# Patient Record
Sex: FEMALE | Race: WHITE | NOT HISPANIC OR LATINO | URBAN - METROPOLITAN AREA
[De-identification: names, ages, dates, MRNs, and addresses within clinical notes are randomized per-mention and may not be internally consistent; named-entity substitution may affect disease eponyms.]

---

## 2017-02-24 ENCOUNTER — IMPORTED ENCOUNTER (OUTPATIENT)
Dept: URBAN - METROPOLITAN AREA CLINIC 43 | Facility: CLINIC | Age: 62
End: 2017-02-24

## 2017-02-24 PROBLEM — H40.1131: Noted: 2017-02-24

## 2017-08-24 ENCOUNTER — IMPORTED ENCOUNTER (OUTPATIENT)
Dept: URBAN - METROPOLITAN AREA CLINIC 43 | Facility: CLINIC | Age: 62
End: 2017-08-24

## 2017-08-24 PROBLEM — H25.13: Noted: 2017-08-24

## 2017-08-24 PROBLEM — H40.1131: Noted: 2017-08-24

## 2018-04-12 ENCOUNTER — IMPORTED ENCOUNTER (OUTPATIENT)
Dept: URBAN - METROPOLITAN AREA CLINIC 43 | Facility: CLINIC | Age: 63
End: 2018-04-12

## 2018-04-12 PROBLEM — H40.1131: Noted: 2018-04-12

## 2018-10-18 ENCOUNTER — IMPORTED ENCOUNTER (OUTPATIENT)
Dept: URBAN - METROPOLITAN AREA CLINIC 43 | Facility: CLINIC | Age: 63
End: 2018-10-18

## 2018-10-18 PROBLEM — H40.1131: Noted: 2018-10-18

## 2018-10-18 PROBLEM — H25.13: Noted: 2018-10-18

## 2018-10-23 ENCOUNTER — IMPORTED ENCOUNTER (OUTPATIENT)
Dept: URBAN - METROPOLITAN AREA CLINIC 43 | Facility: CLINIC | Age: 63
End: 2018-10-23

## 2019-04-11 ENCOUNTER — IMPORTED ENCOUNTER (OUTPATIENT)
Dept: URBAN - METROPOLITAN AREA CLINIC 43 | Facility: CLINIC | Age: 64
End: 2019-04-11

## 2019-04-11 ENCOUNTER — PREPPED CHART (OUTPATIENT)
Dept: URBAN - METROPOLITAN AREA CLINIC 32 | Facility: CLINIC | Age: 64
End: 2019-04-11

## 2019-04-11 PROBLEM — H40.1131: Noted: 2019-04-11

## 2019-09-26 ASSESSMENT — VISUAL ACUITY
OD_CC: J1+
OD_SC: 20/20-1
OS_CC: J1+
OS_SC: 20/25+2

## 2019-09-26 ASSESSMENT — PACHYMETRY
OD_CT_UM: 536
OS_CT_UM: 517

## 2019-09-26 ASSESSMENT — TONOMETRY
OS_IOP_MMHG: 13
OD_IOP_MMHG: 14

## 2019-10-17 ENCOUNTER — ESTABLISHED COMPREHENSIVE EXAM (OUTPATIENT)
Dept: URBAN - METROPOLITAN AREA CLINIC 32 | Facility: CLINIC | Age: 64
End: 2019-10-17

## 2019-10-17 DIAGNOSIS — H40.1131: ICD-10-CM

## 2019-10-17 DIAGNOSIS — H25.13: ICD-10-CM

## 2019-10-17 PROCEDURE — 92133 CPTRZD OPH DX IMG PST SGM ON: CPT

## 2019-10-17 PROCEDURE — 92014 COMPRE OPH EXAM EST PT 1/>: CPT

## 2019-10-17 PROCEDURE — 92015 DETERMINE REFRACTIVE STATE: CPT

## 2019-10-17 ASSESSMENT — KERATOMETRY
OS_K1POWER_DIOPTERS: 46.75
OD_AXISANGLE_DEGREES: 92
OD_AXISANGLE2_DEGREES: 2
OS_AXISANGLE_DEGREES: 72
OD_K2POWER_DIOPTERS: 45.5
OS_AXISANGLE2_DEGREES: 162
OS_K2POWER_DIOPTERS: 49.5
OD_K1POWER_DIOPTERS: 45.5

## 2019-10-17 ASSESSMENT — VISUAL ACUITY
OS_BAT: 20/80
OS_SC: 20/200
OS_SC: J3-1
OD_BAT: 20/400
OS_CC: 20/25-2
OD_CC: 20/30-2
OD_SC: J3-1
OD_SC: 20/200

## 2019-10-17 ASSESSMENT — TONOMETRY
OD_IOP_MMHG: 15
OS_IOP_MMHG: 15

## 2020-03-01 ASSESSMENT — VISUAL ACUITY
OD_SC: 20/150
OS_SC: 20/100
OD_CC: J1+
OS_CC: 20/25
OS_CC: J1+
OS_CC: J1+/-
OD_CC: 20/20-1
OD_CC: J1+/-
OS_CC: 20/25+2
OD_CC: 20/20
OS_CC: 20/20
OD_CC: J1+
OS_CC: 20/20
OD_CC: 20/20 -1
OS_CC: 20/20 -2
OS_CC: J1+
OD_CC: 20/20 -2
OD_CC: 20/20-2

## 2020-03-01 ASSESSMENT — TONOMETRY
OS_IOP_MMHG: 13.0
OD_IOP_MMHG: 14.0
OS_IOP_MMHG: 13.0
OD_IOP_MMHG: 12.0
OD_IOP_MMHG: 14.0
OS_IOP_MMHG: 13.0
OD_IOP_MMHG: 14.0
OD_IOP_MMHG: 12.0

## 2020-09-09 NOTE — PATIENT DISCUSSION
09/09/2020[id number]/20&nbsp;SN &nbsp; &nbsp; lcs

## 2021-06-25 ENCOUNTER — ESTABLISHED COMPREHENSIVE EXAM (OUTPATIENT)
Dept: URBAN - METROPOLITAN AREA CLINIC 32 | Facility: CLINIC | Age: 66
End: 2021-06-25

## 2021-06-25 DIAGNOSIS — H40.1131: ICD-10-CM

## 2021-06-25 PROCEDURE — 92083 EXTENDED VISUAL FIELD XM: CPT

## 2021-06-25 PROCEDURE — 92014 COMPRE OPH EXAM EST PT 1/>: CPT

## 2021-06-25 ASSESSMENT — KERATOMETRY
OS_AXISANGLE2_DEGREES: 163
OD_AXISANGLE_DEGREES: 96
OS_K1POWER_DIOPTERS: 46.75
OD_K1POWER_DIOPTERS: 46
OD_AXISANGLE2_DEGREES: 6
OD_K2POWER_DIOPTERS: 49.25
OS_AXISANGLE_DEGREES: 73
OS_K2POWER_DIOPTERS: 49.75

## 2021-06-25 ASSESSMENT — TONOMETRY
OS_IOP_MMHG: 15
OD_IOP_MMHG: 14

## 2021-06-25 ASSESSMENT — VISUAL ACUITY
OS_CC: J1+
OS_CC: 20/30
OD_CC: J1+
OD_CC: 20/30-2

## 2022-01-10 ENCOUNTER — FOLLOW UP (OUTPATIENT)
Dept: URBAN - METROPOLITAN AREA CLINIC 32 | Facility: CLINIC | Age: 67
End: 2022-01-10

## 2022-01-10 DIAGNOSIS — H40.1131: ICD-10-CM

## 2022-01-10 PROCEDURE — 92133 CPTRZD OPH DX IMG PST SGM ON: CPT

## 2022-01-10 PROCEDURE — 92012 INTRM OPH EXAM EST PATIENT: CPT

## 2022-01-10 ASSESSMENT — KERATOMETRY
OD_K1POWER_DIOPTERS: 46
OS_K1POWER_DIOPTERS: 46.75
OD_AXISANGLE2_DEGREES: 6
OS_AXISANGLE_DEGREES: 73
OD_K2POWER_DIOPTERS: 49.25
OS_AXISANGLE2_DEGREES: 163
OS_K2POWER_DIOPTERS: 49.75
OD_AXISANGLE_DEGREES: 96

## 2022-01-10 ASSESSMENT — VISUAL ACUITY
OD_CC: 20/30
OS_GLARE: 20/400
OD_GLARE: 20/400
OS_CC: 20/30

## 2022-01-10 ASSESSMENT — TONOMETRY
OD_IOP_MMHG: 13
OS_IOP_MMHG: 11

## 2022-01-31 ENCOUNTER — DIAGNOSTICS ONLY (OUTPATIENT)
Dept: URBAN - METROPOLITAN AREA CLINIC 32 | Facility: CLINIC | Age: 67
End: 2022-01-31

## 2022-01-31 DIAGNOSIS — H25.13: ICD-10-CM

## 2022-01-31 DIAGNOSIS — H35.361: ICD-10-CM

## 2022-01-31 DIAGNOSIS — H16.223: ICD-10-CM

## 2022-01-31 DIAGNOSIS — H40.1131: ICD-10-CM

## 2022-01-31 DIAGNOSIS — H25.813: ICD-10-CM

## 2022-01-31 PROCEDURE — 92014 COMPRE OPH EXAM EST PT 1/>: CPT

## 2022-01-31 PROCEDURE — 92136TC INTERFEROMETRY - TECHNICAL COMPONENT

## 2022-01-31 PROCEDURE — 92025 CPTRIZED CORNEAL TOPOGRAPHY: CPT

## 2022-01-31 PROCEDURE — 92134 CPTRZ OPH DX IMG PST SGM RTA: CPT

## 2022-01-31 PROCEDURE — V2799PMN IMPRIMIS PRED-MOXI-NEPAF 5ML

## 2022-01-31 ASSESSMENT — VISUAL ACUITY
OS_CC: 20/20
OD_SC: J3-1
OS_GLARE: 20/400
OS_CC: J1+
OD_CC: 20/20
OS_SC: J3-1
OD_SC: 20/200
OS_SC: 20/200
OD_GLARE: 20/400
OD_CC: J1+

## 2022-01-31 ASSESSMENT — KERATOMETRY
OS_K1POWER_DIOPTERS: 46.75
OD_AXISANGLE_DEGREES: 96
OS_AXISANGLE2_DEGREES: 163
OS_K2POWER_DIOPTERS: 49.75
OD_K2POWER_DIOPTERS: 49.25
OD_K1POWER_DIOPTERS: 46
OD_AXISANGLE2_DEGREES: 6
OS_AXISANGLE_DEGREES: 73

## 2022-02-08 ENCOUNTER — SURGERY/PROCEDURE (OUTPATIENT)
Dept: URBAN - METROPOLITAN AREA CLINIC 32 | Facility: CLINIC | Age: 67
End: 2022-02-08

## 2022-02-08 DIAGNOSIS — H25.811: ICD-10-CM

## 2022-02-08 PROCEDURE — 66984CV REMOVE CATARACT, INSERT LENS, CUSTOM VISION

## 2022-02-08 ASSESSMENT — KERATOMETRY
OD_AXISANGLE_DEGREES: 96
OS_AXISANGLE2_DEGREES: 163
OS_AXISANGLE_DEGREES: 73
OD_AXISANGLE2_DEGREES: 6
OD_K2POWER_DIOPTERS: 49.25
OS_K2POWER_DIOPTERS: 49.75
OD_K1POWER_DIOPTERS: 46
OS_K1POWER_DIOPTERS: 46.75

## 2022-02-09 ENCOUNTER — POST-OP (OUTPATIENT)
Dept: URBAN - METROPOLITAN AREA CLINIC 32 | Facility: CLINIC | Age: 67
End: 2022-02-09

## 2022-02-09 DIAGNOSIS — Z96.1: ICD-10-CM

## 2022-02-09 PROCEDURE — 99024 POSTOP FOLLOW-UP VISIT: CPT

## 2022-02-09 ASSESSMENT — VISUAL ACUITY: OD_SC: 20/30-2

## 2022-02-09 ASSESSMENT — TONOMETRY: OD_IOP_MMHG: 21

## 2022-02-11 ASSESSMENT — KERATOMETRY
OD_K1POWER_DIOPTERS: 46
OD_K2POWER_DIOPTERS: 49.25
OS_AXISANGLE_DEGREES: 73
OS_K1POWER_DIOPTERS: 46.75
OD_AXISANGLE2_DEGREES: 6
OD_AXISANGLE_DEGREES: 96
OS_K2POWER_DIOPTERS: 49.75
OS_AXISANGLE2_DEGREES: 163

## 2022-02-16 ENCOUNTER — POST-OP (OUTPATIENT)
Dept: URBAN - METROPOLITAN AREA CLINIC 32 | Facility: CLINIC | Age: 67
End: 2022-02-16

## 2022-02-16 DIAGNOSIS — H25.812: ICD-10-CM

## 2022-02-16 PROCEDURE — 92012 INTRM OPH EXAM EST PATIENT: CPT

## 2022-02-16 ASSESSMENT — KERATOMETRY
OD_AXISANGLE_DEGREES: 96
OS_K1POWER_DIOPTERS: 46.75
OS_AXISANGLE2_DEGREES: 163
OD_K1POWER_DIOPTERS: 46
OS_AXISANGLE_DEGREES: 73
OS_K2POWER_DIOPTERS: 49.75
OD_AXISANGLE2_DEGREES: 6
OD_K2POWER_DIOPTERS: 49.25

## 2022-02-16 ASSESSMENT — TONOMETRY: OD_IOP_MMHG: 15

## 2022-02-16 ASSESSMENT — VISUAL ACUITY
OD_SC: J10
OD_SC: 20/20
OS_GLARE: 20/400

## 2022-02-22 ENCOUNTER — SURGERY/PROCEDURE (OUTPATIENT)
Dept: URBAN - METROPOLITAN AREA CLINIC 32 | Facility: CLINIC | Age: 67
End: 2022-02-22

## 2022-02-22 DIAGNOSIS — H25.812: ICD-10-CM

## 2022-02-22 PROCEDURE — 66984CV REMOVE CATARACT, INSERT LENS, CUSTOM VISION

## 2022-02-23 ENCOUNTER — POST-OP (OUTPATIENT)
Dept: URBAN - METROPOLITAN AREA CLINIC 32 | Facility: CLINIC | Age: 67
End: 2022-02-23

## 2022-02-23 DIAGNOSIS — Z96.1: ICD-10-CM

## 2022-02-23 PROCEDURE — 99024 POSTOP FOLLOW-UP VISIT: CPT

## 2022-02-23 ASSESSMENT — KERATOMETRY
OS_AXISANGLE_DEGREES: 73
OS_K1POWER_DIOPTERS: 46.75
OD_K2POWER_DIOPTERS: 49.25
OS_K2POWER_DIOPTERS: 49.75
OD_AXISANGLE2_DEGREES: 6
OS_AXISANGLE2_DEGREES: 163
OD_K1POWER_DIOPTERS: 46
OD_AXISANGLE_DEGREES: 96

## 2022-02-23 ASSESSMENT — VISUAL ACUITY
OS_PH: 20/50
OS_SC: 20/60

## 2022-02-23 ASSESSMENT — TONOMETRY: OS_IOP_MMHG: 24

## 2022-02-25 ASSESSMENT — KERATOMETRY
OS_AXISANGLE2_DEGREES: 163
OD_AXISANGLE_DEGREES: 96
OS_AXISANGLE_DEGREES: 73
OS_K2POWER_DIOPTERS: 49.75
OD_AXISANGLE2_DEGREES: 6
OD_K2POWER_DIOPTERS: 49.25
OD_K1POWER_DIOPTERS: 46
OS_K1POWER_DIOPTERS: 46.75

## 2022-03-02 ENCOUNTER — POST-OP (OUTPATIENT)
Dept: URBAN - METROPOLITAN AREA CLINIC 32 | Facility: CLINIC | Age: 67
End: 2022-03-02

## 2022-03-02 DIAGNOSIS — Z96.1: ICD-10-CM

## 2022-03-02 PROCEDURE — 99024 POSTOP FOLLOW-UP VISIT: CPT

## 2022-03-02 RX ORDER — DORZOLAMIDE 20 MG/ML: 1 SOLUTION/ DROPS OPHTHALMIC

## 2022-03-02 ASSESSMENT — KERATOMETRY
OS_K1POWER_DIOPTERS: 46.75
OD_K1POWER_DIOPTERS: 46
OS_K2POWER_DIOPTERS: 49.75
OD_K2POWER_DIOPTERS: 49.25
OS_AXISANGLE2_DEGREES: 163
OD_AXISANGLE_DEGREES: 96
OD_AXISANGLE2_DEGREES: 6
OS_AXISANGLE_DEGREES: 73

## 2022-03-02 ASSESSMENT — VISUAL ACUITY
OS_SC: 20/40
OS_PH: 20/30

## 2022-03-02 ASSESSMENT — TONOMETRY
OS_IOP_MMHG: 28
OD_IOP_MMHG: 18

## 2022-03-08 ENCOUNTER — POST-OP (OUTPATIENT)
Dept: URBAN - METROPOLITAN AREA CLINIC 32 | Facility: CLINIC | Age: 67
End: 2022-03-08

## 2022-03-08 DIAGNOSIS — Z96.1: ICD-10-CM

## 2022-03-08 DIAGNOSIS — H43.813: ICD-10-CM

## 2022-03-08 PROCEDURE — 99024 POSTOP FOLLOW-UP VISIT: CPT

## 2022-03-08 RX ORDER — DORZOLAMIDE HYDROCHLORIDE TIMOLOL MALEATE 20; 5 MG/ML; MG/ML: 1 SOLUTION/ DROPS OPHTHALMIC ONCE A DAY

## 2022-03-08 ASSESSMENT — TONOMETRY
OD_IOP_MMHG: 10
OS_IOP_MMHG: 12

## 2022-03-08 ASSESSMENT — KERATOMETRY
OS_K1POWER_DIOPTERS: 46.75
OD_K1POWER_DIOPTERS: 46
OD_AXISANGLE_DEGREES: 96
OD_K2POWER_DIOPTERS: 49.25
OS_AXISANGLE2_DEGREES: 163
OS_K2POWER_DIOPTERS: 49.75
OD_AXISANGLE2_DEGREES: 6
OS_AXISANGLE_DEGREES: 73

## 2022-03-08 ASSESSMENT — VISUAL ACUITY
OD_SC: 20/25+2
OS_SC: 20/25+2

## 2022-03-22 ENCOUNTER — POST-OP (OUTPATIENT)
Dept: URBAN - METROPOLITAN AREA CLINIC 32 | Facility: CLINIC | Age: 67
End: 2022-03-22

## 2022-03-22 DIAGNOSIS — Z96.1: ICD-10-CM

## 2022-03-22 PROCEDURE — 99024 POSTOP FOLLOW-UP VISIT: CPT

## 2022-03-22 ASSESSMENT — TONOMETRY
OD_IOP_MMHG: 11
OS_IOP_MMHG: 13

## 2022-03-22 ASSESSMENT — KERATOMETRY
OD_K1POWER_DIOPTERS: 46
OS_K2POWER_DIOPTERS: 49.75
OD_AXISANGLE2_DEGREES: 6
OD_K2POWER_DIOPTERS: 49.25
OS_AXISANGLE2_DEGREES: 163
OS_AXISANGLE_DEGREES: 73
OD_AXISANGLE_DEGREES: 96
OS_K1POWER_DIOPTERS: 46.75

## 2022-03-22 ASSESSMENT — VISUAL ACUITY
OS_SC: 20/20-1
OD_SC: 20/20-1

## 2022-08-17 ENCOUNTER — FOLLOW UP (OUTPATIENT)
Dept: URBAN - METROPOLITAN AREA CLINIC 32 | Facility: CLINIC | Age: 67
End: 2022-08-17

## 2022-08-17 DIAGNOSIS — H40.1131: ICD-10-CM

## 2022-08-17 PROCEDURE — 92012 INTRM OPH EXAM EST PATIENT: CPT

## 2022-08-17 PROCEDURE — 92083 EXTENDED VISUAL FIELD XM: CPT

## 2022-08-17 ASSESSMENT — VISUAL ACUITY
OD_SC: 20/20
OS_SC: 20/20

## 2022-08-17 ASSESSMENT — KERATOMETRY
OD_AXISANGLE_DEGREES: 96
OD_K2POWER_DIOPTERS: 49.25
OS_K2POWER_DIOPTERS: 49.75
OS_K1POWER_DIOPTERS: 46.75
OS_AXISANGLE2_DEGREES: 163
OD_AXISANGLE2_DEGREES: 6
OS_AXISANGLE_DEGREES: 73
OD_K1POWER_DIOPTERS: 46

## 2022-08-17 ASSESSMENT — TONOMETRY
OD_IOP_MMHG: 11
OS_IOP_MMHG: 13

## 2023-08-14 ENCOUNTER — FOLLOW UP (OUTPATIENT)
Dept: URBAN - METROPOLITAN AREA CLINIC 32 | Facility: CLINIC | Age: 68
End: 2023-08-14

## 2023-08-14 DIAGNOSIS — H40.1131: ICD-10-CM

## 2023-08-14 DIAGNOSIS — H35.361: ICD-10-CM

## 2023-08-14 DIAGNOSIS — H43.813: ICD-10-CM

## 2023-08-14 PROCEDURE — 99213 OFFICE O/P EST LOW 20 MIN: CPT

## 2023-08-14 PROCEDURE — 92083 EXTENDED VISUAL FIELD XM: CPT

## 2023-08-14 PROCEDURE — 92250 FUNDUS PHOTOGRAPHY W/I&R: CPT

## 2023-08-14 ASSESSMENT — KERATOMETRY
OD_AXISANGLE_DEGREES: 96
OD_AXISANGLE2_DEGREES: 6
OS_AXISANGLE_DEGREES: 73
OS_K2POWER_DIOPTERS: 49.75
OD_K2POWER_DIOPTERS: 49.25
OS_K1POWER_DIOPTERS: 46.75
OS_AXISANGLE2_DEGREES: 163
OD_K1POWER_DIOPTERS: 46

## 2023-08-14 ASSESSMENT — VISUAL ACUITY
OS_SC: 20/25-2
OD_SC: 20/25-2

## 2023-08-14 ASSESSMENT — TONOMETRY
OD_IOP_MMHG: 14
OS_IOP_MMHG: 16

## 2024-02-15 ENCOUNTER — COMPREHENSIVE EXAM (OUTPATIENT)
Dept: URBAN - METROPOLITAN AREA CLINIC 32 | Facility: CLINIC | Age: 69
End: 2024-02-15

## 2024-02-15 DIAGNOSIS — H43.813: ICD-10-CM

## 2024-02-15 DIAGNOSIS — H35.361: ICD-10-CM

## 2024-02-15 DIAGNOSIS — H26.493: ICD-10-CM

## 2024-02-15 DIAGNOSIS — H16.223: ICD-10-CM

## 2024-02-15 DIAGNOSIS — H40.1131: ICD-10-CM

## 2024-02-15 PROCEDURE — 92133 CPTRZD OPH DX IMG PST SGM ON: CPT

## 2024-02-15 PROCEDURE — 99214 OFFICE O/P EST MOD 30 MIN: CPT

## 2024-02-15 ASSESSMENT — VISUAL ACUITY
OS_CC: 20/20
OD_GLARE: 20/40
OS_SC: 20/20-2
OD_CC: 20/20
OS_CC: J1+
OD_CC: J1+
OS_SC: J2
OD_SC: J5-2
OD_SC: 20/20-1
OS_GLARE: 20/40

## 2024-02-15 ASSESSMENT — KERATOMETRY
OS_K1POWER_DIOPTERS: 49.75
OD_K2POWER_DIOPTERS: 45.50
OD_AXISANGLE_DEGREES: 5
OS_AXISANGLE2_DEGREES: 71
OD_K1POWER_DIOPTERS: 49.00
OD_AXISANGLE2_DEGREES: 95
OS_AXISANGLE_DEGREES: 161
OS_K2POWER_DIOPTERS: 47.25

## 2024-02-15 ASSESSMENT — TONOMETRY
OS_IOP_MMHG: 13
OD_IOP_MMHG: 11

## 2024-05-14 ENCOUNTER — LAB OUTSIDE AN ENCOUNTER (OUTPATIENT)
Dept: URBAN - METROPOLITAN AREA CLINIC 68 | Facility: CLINIC | Age: 69
End: 2024-05-14

## 2024-05-14 ENCOUNTER — DASHBOARD ENCOUNTERS (OUTPATIENT)
Age: 69
End: 2024-05-14

## 2024-05-14 ENCOUNTER — OFFICE VISIT (OUTPATIENT)
Dept: URBAN - METROPOLITAN AREA CLINIC 68 | Facility: CLINIC | Age: 69
End: 2024-05-14
Payer: MEDICARE

## 2024-05-14 VITALS
SYSTOLIC BLOOD PRESSURE: 122 MMHG | OXYGEN SATURATION: 98 % | HEIGHT: 64 IN | DIASTOLIC BLOOD PRESSURE: 82 MMHG | BODY MASS INDEX: 23.73 KG/M2 | WEIGHT: 139 LBS | HEART RATE: 78 BPM

## 2024-05-14 DIAGNOSIS — K59.09 CHRONIC CONSTIPATION: ICD-10-CM

## 2024-05-14 DIAGNOSIS — Z87.11 HISTORY OF PEPTIC ULCER DISEASE: ICD-10-CM

## 2024-05-14 DIAGNOSIS — K62.5 RECTAL BLEEDING: ICD-10-CM

## 2024-05-14 DIAGNOSIS — K64.9 HEMORRHOIDS, UNSPECIFIED HEMORRHOID TYPE: ICD-10-CM

## 2024-05-14 DIAGNOSIS — Z12.11 COLON CANCER SCREENING: ICD-10-CM

## 2024-05-14 PROBLEM — 12063002: Status: ACTIVE | Noted: 2024-05-14

## 2024-05-14 PROBLEM — 236069009: Status: ACTIVE | Noted: 2024-05-14

## 2024-05-14 PROBLEM — 305058001: Status: ACTIVE | Noted: 2024-05-14

## 2024-05-14 PROBLEM — 266998003: Status: ACTIVE | Noted: 2024-05-14

## 2024-05-14 PROCEDURE — 99204 OFFICE O/P NEW MOD 45 MIN: CPT | Performed by: INTERNAL MEDICINE

## 2024-05-14 RX ORDER — LATANOPROST 50 UG/ML
1 DROP INTO AFFECTED EYE IN THE EVENING SOLUTION/ DROPS OPHTHALMIC ONCE A DAY
Status: ACTIVE | COMMUNITY

## 2024-05-14 RX ORDER — HYDROCORTISONE 25 MG/G
1 APPLICATION CREAM TOPICAL TWICE A DAY
Qty: 20 APPLICATOR | Refills: 3 | OUTPATIENT
Start: 2024-05-14 | End: 2024-06-23

## 2024-05-14 RX ORDER — CLOPIDOGREL BISULFATE 75 MG/1
1 TABLET TABLET ORAL ONCE A DAY
Status: ACTIVE | COMMUNITY

## 2024-05-14 RX ORDER — FAMOTIDINE 20 MG/1
1 TABLET AT BEDTIME AS NEEDED TABLET, FILM COATED ORAL ONCE A DAY
Status: ACTIVE | COMMUNITY

## 2024-05-14 RX ORDER — LORATADINE 10 MG
1 PACKET MIXED WITH 8 OUNCES OF FLUID TABLET,DISINTEGRATING ORAL ONCE A DAY
Status: ACTIVE | COMMUNITY

## 2024-05-14 RX ORDER — ATORVASTATIN CALCIUM 20 MG/1
1 TABLET TABLET, FILM COATED ORAL ONCE A DAY
Status: ACTIVE | COMMUNITY

## 2024-05-21 NOTE — PATIENT DISCUSSION
07/26/2018OS-5.50+[id number]/20&nbsp;SN &nbsp; &nbsp;
Anti-reflective
COUNSELING:
Comments:ANY COMBO
Dry Eye Syndrome Counseling: I have discussed the diagnosis and the pathophysiology of this disease with the patient. Eyelid pathology and systemic illnesses such as Sjogren?s disease or rheumatoid arthritis may contribute to severity. Vision may be limited by dry eye, and symptoms exacerbated by environmental factors such as smoke, wind, or prolonged eye use. Treatment options include, but are not limited to, artificial tears, punctal plugs, topical cyclosporine, oral omega-3 supplements, and lubricating ointments. I stressed the importance of compliance with treatment.
General:
Glasses Prescribed:
Lens Material:
Lens Treatment:
MILD DRY EYES: PRESCRIBED ARTIFICIAL TEARS BID - QID, OU RETURN FOR FOLLOW-UP AS SCHEDULED OR SOONER IF SYMPTOMS WORSEN.
MYOPIA OU: PRESCRIBED GLASSES AND OR CONTACTS. FOLLOW-UP AS SCHEDULED.
Myopia Counseling: The diagnosis of myopia (nearsightedness) was discussed with the patient. I explained to the patient that people who are nearsighted may be at an increased risk of a retinal detachment. Possible symptoms of retinal detachment including new onset of significant floaters or flashes or a sudden decrease in vision were reviewed with the patient. The patient understands that any of these symptoms require an immediate call to the office for an examination that day. Options for the correction of the patient's myopia were discussed may include glasses, contacts or elective refractive surgery. Return for follow-up as scheduled.
Scratch resistant
Single Vision - Daily wearOD-3.50+[id number]/25 +2&nbsp;SN &nbsp; &nbsp;
Slab Off:No
Vertex Distance:
spherecylinderaxisaddprismvertexVAInt VANVExaminer
Admission

## 2024-08-12 ENCOUNTER — OFFICE VISIT (OUTPATIENT)
Dept: URBAN - METROPOLITAN AREA CLINIC 68 | Facility: CLINIC | Age: 69
End: 2024-08-12
Payer: MEDICARE

## 2024-08-12 ENCOUNTER — LAB OUTSIDE AN ENCOUNTER (OUTPATIENT)
Dept: URBAN - METROPOLITAN AREA CLINIC 68 | Facility: CLINIC | Age: 69
End: 2024-08-12

## 2024-08-12 ENCOUNTER — TELEPHONE ENCOUNTER (OUTPATIENT)
Dept: URBAN - METROPOLITAN AREA CLINIC 68 | Facility: CLINIC | Age: 69
End: 2024-08-12

## 2024-08-12 VITALS
BODY MASS INDEX: 23.73 KG/M2 | SYSTOLIC BLOOD PRESSURE: 126 MMHG | HEART RATE: 78 BPM | DIASTOLIC BLOOD PRESSURE: 80 MMHG | OXYGEN SATURATION: 98 % | HEIGHT: 64 IN | WEIGHT: 139 LBS

## 2024-08-12 DIAGNOSIS — K59.09 CHRONIC CONSTIPATION: ICD-10-CM

## 2024-08-12 DIAGNOSIS — R19.4 CHANGE IN BOWEL HABITS: ICD-10-CM

## 2024-08-12 DIAGNOSIS — Z12.11 COLON CANCER SCREENING: ICD-10-CM

## 2024-08-12 PROBLEM — 88111009: Status: ACTIVE | Noted: 2024-08-12

## 2024-08-12 PROCEDURE — 99214 OFFICE O/P EST MOD 30 MIN: CPT

## 2024-08-12 RX ORDER — SOD SULF/POT CHLORIDE/MAG SULF 1.479 G
12 TABLETS THE FIRST DOSE THE EVENING BEFORE AND SECOND DOSE THE MORNING OF COLONOSCOPY TABLET ORAL TWICE A DAY
Qty: 24 | Refills: 0 | OUTPATIENT
Start: 2024-08-12 | End: 2024-08-13

## 2024-08-12 RX ORDER — CLOPIDOGREL BISULFATE 75 MG/1
1 TABLET TABLET ORAL ONCE A DAY
Status: ACTIVE | COMMUNITY

## 2024-08-12 RX ORDER — ATORVASTATIN CALCIUM 20 MG/1
1 TABLET TABLET, FILM COATED ORAL ONCE A DAY
Status: ACTIVE | COMMUNITY

## 2024-08-12 RX ORDER — LORATADINE 10 MG
1 PACKET MIXED WITH 8 OUNCES OF FLUID TABLET,DISINTEGRATING ORAL ONCE A DAY
Status: ACTIVE | COMMUNITY

## 2024-08-12 RX ORDER — FAMOTIDINE 20 MG/1
1 TABLET AT BEDTIME AS NEEDED TABLET, FILM COATED ORAL ONCE A DAY
Status: ACTIVE | COMMUNITY

## 2024-08-12 RX ORDER — LATANOPROST 50 UG/ML
1 DROP INTO AFFECTED EYE IN THE EVENING SOLUTION/ DROPS OPHTHALMIC ONCE A DAY
Status: ACTIVE | COMMUNITY

## 2024-08-12 NOTE — HPI-TODAY'S VISIT:
69 y.o. WF with a history of questionable TIA (1/2023) who is on Clopidogrel (PCP Dr. Hall) for over a year who is here for a change in bowel habits. Per patient she was started on a course of pcn by her dentist and ultimately developed abdominal discomfort with diarrhea 7/8/24. Abdominal pain resolved however she presents today due to persistent watery diarrhea. She is having at least 3 episodes daily of watery urgent diarrhea and does admit to nocturnal BMs most nights. She describes this as a significant change as she does have a history of chronic constipation which she was treating with Fiber and Miralax/d. She does have history of rectal bleeding and hemorrhoids however is currently asymptomatic. She did have a colonoscopy in 2016 which showed IH with Dr. Burgos. She is having some intermittent mild nausea but associates this with episodes of diarrhea. She describes being under a significant amount of stress for the past 1-2 months. She denies nausea/vomiting, dysphagia, odynophagia, abdominal pain, melena, rectal bleeding, weight loss, fever.

## 2024-08-14 ENCOUNTER — TELEPHONE ENCOUNTER (OUTPATIENT)
Dept: URBAN - METROPOLITAN AREA CLINIC 68 | Facility: CLINIC | Age: 69
End: 2024-08-14

## 2024-08-16 ENCOUNTER — WEB ENCOUNTER (OUTPATIENT)
Dept: URBAN - METROPOLITAN AREA CLINIC 68 | Facility: CLINIC | Age: 69
End: 2024-08-16

## 2024-08-16 ENCOUNTER — TELEPHONE ENCOUNTER (OUTPATIENT)
Dept: URBAN - METROPOLITAN AREA CLINIC 6 | Facility: CLINIC | Age: 69
End: 2024-08-16

## 2024-08-19 ENCOUNTER — FOLLOW UP (OUTPATIENT)
Dept: URBAN - METROPOLITAN AREA CLINIC 32 | Facility: CLINIC | Age: 69
End: 2024-08-19

## 2024-08-19 DIAGNOSIS — H35.361: ICD-10-CM

## 2024-08-19 DIAGNOSIS — H40.1131: ICD-10-CM

## 2024-08-19 PROCEDURE — 92083 EXTENDED VISUAL FIELD XM: CPT

## 2024-08-19 PROCEDURE — 99213 OFFICE O/P EST LOW 20 MIN: CPT

## 2024-08-19 PROCEDURE — 92250 FUNDUS PHOTOGRAPHY W/I&R: CPT

## 2024-08-19 ASSESSMENT — VISUAL ACUITY
OD_SC: 20/20-2
OS_SC: 20/20-2

## 2024-08-19 ASSESSMENT — TONOMETRY
OS_IOP_MMHG: 15
OD_IOP_MMHG: 13

## 2024-08-19 ASSESSMENT — KERATOMETRY
OS_K2POWER_DIOPTERS: 47.25
OS_K1POWER_DIOPTERS: 49.75
OD_K2POWER_DIOPTERS: 45.50
OD_AXISANGLE_DEGREES: 5
OD_AXISANGLE2_DEGREES: 95
OD_K1POWER_DIOPTERS: 49.00
OS_AXISANGLE_DEGREES: 161
OS_AXISANGLE2_DEGREES: 71

## 2024-08-26 ENCOUNTER — CLAIMS CREATED FROM THE CLAIM WINDOW (OUTPATIENT)
Dept: URBAN - METROPOLITAN AREA CLINIC 4 | Facility: CLINIC | Age: 69
End: 2024-08-26
Payer: MEDICARE

## 2024-08-26 ENCOUNTER — CLAIMS CREATED FROM THE CLAIM WINDOW (OUTPATIENT)
Dept: URBAN - METROPOLITAN AREA SURGERY CENTER 12 | Facility: SURGERY CENTER | Age: 69
End: 2024-08-26
Payer: MEDICARE

## 2024-08-26 DIAGNOSIS — K57.30 DIVERTICULOSIS OF LARGE INTESTINE WITHOUT PERFORATION OR ABSCESS WITHOUT BLEEDING: ICD-10-CM

## 2024-08-26 DIAGNOSIS — K64.0 FIRST DEGREE HEMORRHOIDS: ICD-10-CM

## 2024-08-26 DIAGNOSIS — K57.30 DIVERTCULOSIS OF LG INT W/O PERFORATION OR ABSCESS W/O BLEEDING: ICD-10-CM

## 2024-08-26 DIAGNOSIS — K52.832 LYMPHOCYTIC COLITIS: ICD-10-CM

## 2024-08-26 DIAGNOSIS — K64.8 OTHER HEMORRHOIDS: ICD-10-CM

## 2024-08-26 DIAGNOSIS — R19.4 CHANGE IN BOWEL HABIT: ICD-10-CM

## 2024-08-26 DIAGNOSIS — K52.832 LYMPHOCYTIC  COLITIS: ICD-10-CM

## 2024-08-26 PROCEDURE — 45380 COLONOSCOPY AND BIOPSY: CPT | Performed by: INTERNAL MEDICINE

## 2024-08-26 PROCEDURE — 88305 TISSUE EXAM BY PATHOLOGIST: CPT | Performed by: PATHOLOGY

## 2024-08-26 PROCEDURE — 45380 COLONOSCOPY AND BIOPSY: CPT | Performed by: CLINIC/CENTER

## 2024-08-26 PROCEDURE — 00811 ANES LWR INTST NDSC NOS: CPT | Performed by: NURSE ANESTHETIST, CERTIFIED REGISTERED

## 2024-08-26 RX ORDER — CLOPIDOGREL BISULFATE 75 MG/1
1 TABLET TABLET ORAL ONCE A DAY
Status: ACTIVE | COMMUNITY

## 2024-08-26 RX ORDER — ATORVASTATIN CALCIUM 20 MG/1
1 TABLET TABLET, FILM COATED ORAL ONCE A DAY
Status: ACTIVE | COMMUNITY

## 2024-08-26 RX ORDER — LORATADINE 10 MG
1 PACKET MIXED WITH 8 OUNCES OF FLUID TABLET,DISINTEGRATING ORAL ONCE A DAY
Status: ACTIVE | COMMUNITY

## 2024-08-26 RX ORDER — FAMOTIDINE 20 MG/1
1 TABLET AT BEDTIME AS NEEDED TABLET, FILM COATED ORAL ONCE A DAY
Status: ACTIVE | COMMUNITY

## 2024-08-26 RX ORDER — LATANOPROST 50 UG/ML
1 DROP INTO AFFECTED EYE IN THE EVENING SOLUTION/ DROPS OPHTHALMIC ONCE A DAY
Status: ACTIVE | COMMUNITY

## 2024-09-03 ENCOUNTER — TELEPHONE ENCOUNTER (OUTPATIENT)
Dept: URBAN - METROPOLITAN AREA CLINIC 68 | Facility: CLINIC | Age: 69
End: 2024-09-03

## 2024-09-23 ENCOUNTER — OFFICE VISIT (OUTPATIENT)
Dept: URBAN - METROPOLITAN AREA CLINIC 68 | Facility: CLINIC | Age: 69
End: 2024-09-23
Payer: MEDICARE

## 2024-09-23 VITALS
WEIGHT: 139 LBS | OXYGEN SATURATION: 98 % | HEART RATE: 88 BPM | HEIGHT: 64 IN | BODY MASS INDEX: 23.73 KG/M2 | SYSTOLIC BLOOD PRESSURE: 108 MMHG | DIASTOLIC BLOOD PRESSURE: 78 MMHG

## 2024-09-23 DIAGNOSIS — Z12.11 COLON CANCER SCREENING: ICD-10-CM

## 2024-09-23 DIAGNOSIS — K59.09 CHRONIC CONSTIPATION: ICD-10-CM

## 2024-09-23 DIAGNOSIS — R19.4 CHANGE IN BOWEL HABITS: ICD-10-CM

## 2024-09-23 DIAGNOSIS — K52.832 LYMPHOCYTIC COLITIS: ICD-10-CM

## 2024-09-23 PROBLEM — 51551000: Status: ACTIVE | Noted: 2024-09-23

## 2024-09-23 PROBLEM — 1187071008: Status: ACTIVE | Noted: 2024-09-23

## 2024-09-23 PROCEDURE — 99213 OFFICE O/P EST LOW 20 MIN: CPT

## 2024-09-23 RX ORDER — LATANOPROST 50 UG/ML
1 DROP INTO AFFECTED EYE IN THE EVENING SOLUTION/ DROPS OPHTHALMIC ONCE A DAY
COMMUNITY

## 2024-09-23 RX ORDER — LORATADINE 10 MG
1 PACKET MIXED WITH 8 OUNCES OF FLUID TABLET,DISINTEGRATING ORAL ONCE A DAY
Status: ON HOLD | COMMUNITY

## 2024-09-23 RX ORDER — ATORVASTATIN CALCIUM 20 MG/1
1 TABLET TABLET, FILM COATED ORAL ONCE A DAY
COMMUNITY

## 2024-09-23 RX ORDER — FAMOTIDINE 20 MG/1
1 TABLET AT BEDTIME AS NEEDED TABLET, FILM COATED ORAL ONCE A DAY
COMMUNITY

## 2024-09-23 RX ORDER — CLOPIDOGREL BISULFATE 75 MG/1
1 TABLET TABLET ORAL ONCE A DAY
COMMUNITY

## 2024-09-23 NOTE — HPI-TODAY'S VISIT:
69 y.o. WF with a history of questionable TIA (1/2023) who is on Clopidogrel (PCP Dr. Hall) for over a year who is here for follow-up s/p colonoscopy 8/26/24 found with diverticulosis and IH, bx consistent with lymphocytic colitis. She was having persistent watery diarrhea for over one month however this has since resolved. She put herself on a 2 week liquid diet and this seems to have resolved the diarrhea. She is now having 3 formed BMs daily. She is having some intermittent hemorrhoidal bleeding. She does have a history in the past of chronic constipation which she was treating with Fiber and Miralax/d. She recently fractured her foot on vacation and may need to have surgery. She is taking Metamucil 4 capsules daily. She understands that if diarrhea returns will recommend budesonide treatment. She defers hemorrhoid banding and feels symptoms are manageable at this time.  Denies nausea/vomiting, dysphagia, odynophagia, heartburn, abdominal pain, melena, diarrhea, cosntipation, weight loss, fever.

## 2025-02-05 ENCOUNTER — OFFICE VISIT (OUTPATIENT)
Dept: URBAN - METROPOLITAN AREA CLINIC 68 | Facility: CLINIC | Age: 70
End: 2025-02-05
Payer: MEDICARE

## 2025-02-05 VITALS
HEART RATE: 71 BPM | OXYGEN SATURATION: 99 % | WEIGHT: 135 LBS | DIASTOLIC BLOOD PRESSURE: 82 MMHG | HEIGHT: 64 IN | SYSTOLIC BLOOD PRESSURE: 122 MMHG | BODY MASS INDEX: 23.05 KG/M2

## 2025-02-05 DIAGNOSIS — K64.1 GRADE II HEMORRHOIDS: ICD-10-CM

## 2025-02-05 DIAGNOSIS — Z87.19 HISTORY OF COLITIS: ICD-10-CM

## 2025-02-05 PROBLEM — 721704005: Status: ACTIVE | Noted: 2025-02-05

## 2025-02-05 PROBLEM — 266999006: Status: ACTIVE | Noted: 2025-02-05

## 2025-02-05 PROCEDURE — 99213 OFFICE O/P EST LOW 20 MIN: CPT | Performed by: INTERNAL MEDICINE

## 2025-02-05 RX ORDER — LATANOPROST 50 UG/ML
1 DROP INTO AFFECTED EYE IN THE EVENING SOLUTION/ DROPS OPHTHALMIC ONCE A DAY
Status: ACTIVE | COMMUNITY

## 2025-02-05 RX ORDER — ATORVASTATIN CALCIUM 20 MG/1
1 TABLET TABLET, FILM COATED ORAL ONCE A DAY
Status: ACTIVE | COMMUNITY

## 2025-02-05 RX ORDER — FAMOTIDINE 20 MG/1
1 TABLET AT BEDTIME AS NEEDED TABLET, FILM COATED ORAL ONCE A DAY
Status: ACTIVE | COMMUNITY

## 2025-02-05 RX ORDER — CLOPIDOGREL BISULFATE 75 MG/1
1 TABLET TABLET ORAL ONCE A DAY
Status: ACTIVE | COMMUNITY

## 2025-02-05 NOTE — HPI-TODAY'S VISIT:
69 y.o. WF with a history of questionable TIA (1/2023) who is on Clopidogrel (PCP Dr. Hall) for over a year who is here for follow-up to discuss hemorrhoid banding. She is  s/p colonoscopy 8/26/24 found with diverticulosis and IH, bx consistent with lymphocytic colitis. She was having persistent watery diarrhea for over one month however this has since resolved and she did not take Budesonide.  She does have some alternating bowel habits and has noticed occasional hemorrhoidal bleeding.  She does have a history in the past of chronic constipation which she was treating with Fiber and Miralax/d. She has a history for cholecystectomy and occasional does have loose stools.  Information on hemorrhoid banding was provided and she will schedule this after her cruise. She would be recommended to hold her Clopidogrel for at least 2 days on day # 2 following her banding. She understands if hemorrhoid banding is not helpful then would recommend referral to colorectal surgeon.

## 2025-02-12 ENCOUNTER — OFFICE VISIT (OUTPATIENT)
Dept: URBAN - METROPOLITAN AREA CLINIC 68 | Facility: CLINIC | Age: 70
End: 2025-02-12
Payer: MEDICARE

## 2025-02-12 VITALS
HEIGHT: 64 IN | OXYGEN SATURATION: 98 % | SYSTOLIC BLOOD PRESSURE: 122 MMHG | BODY MASS INDEX: 23.22 KG/M2 | HEART RATE: 76 BPM | WEIGHT: 136 LBS | DIASTOLIC BLOOD PRESSURE: 80 MMHG

## 2025-02-12 DIAGNOSIS — K64.8 INTERNAL HEMORRHOIDS: ICD-10-CM

## 2025-02-12 DIAGNOSIS — K64.1 GRADE II HEMORRHOIDS: ICD-10-CM

## 2025-02-12 PROBLEM — 90458007: Status: ACTIVE | Noted: 2025-02-12

## 2025-02-12 PROCEDURE — 46221 LIGATION OF HEMORRHOID(S): CPT | Performed by: INTERNAL MEDICINE

## 2025-02-12 PROCEDURE — 99213 OFFICE O/P EST LOW 20 MIN: CPT | Performed by: INTERNAL MEDICINE

## 2025-02-12 RX ORDER — CLOPIDOGREL BISULFATE 75 MG/1
1 TABLET TABLET ORAL ONCE A DAY
Status: ACTIVE | COMMUNITY

## 2025-02-12 RX ORDER — ATORVASTATIN CALCIUM 20 MG/1
1 TABLET TABLET, FILM COATED ORAL ONCE A DAY
Status: ACTIVE | COMMUNITY

## 2025-02-12 RX ORDER — LATANOPROST 50 UG/ML
1 DROP INTO AFFECTED EYE IN THE EVENING SOLUTION/ DROPS OPHTHALMIC ONCE A DAY
Status: ACTIVE | COMMUNITY

## 2025-02-12 RX ORDER — FAMOTIDINE 20 MG/1
1 TABLET AT BEDTIME AS NEEDED TABLET, FILM COATED ORAL ONCE A DAY
Status: ACTIVE | COMMUNITY

## 2025-02-12 NOTE — HPI-TODAY'S VISIT:
69 y.o. WF with a history of questionable TIA (1/2023) who is on Clopidogrel (PCP Dr. Hall) for over a year who is here for first  hemorrhoid banding. She is  s/p colonoscopy 8/26/24 found with diverticulosis and IH, bx consistent with lymphocytic colitis. She was having persistent watery diarrhea for over one month however this has since resolved and she did not take Budesonide.  She does have some alternating bowel habits and has noticed occasional hemorrhoidal bleeding.  She does have a history in the past of chronic constipation which she was treating with Fiber and Miralax/d. She has a history for cholecystectomy and occasional does have loose stools.  A hemorrhoid band was placed in left lateral position w/o complications. She would be recommended to hold her Clopidogrel for at least 2 days on day # 2 following her banding. She understands if hemorrhoid banding is not helpful then would recommend referral to colorectal surgeon.

## 2025-02-18 ENCOUNTER — COMPREHENSIVE EXAM (OUTPATIENT)
Age: 70
End: 2025-02-18

## 2025-02-18 DIAGNOSIS — H35.361: ICD-10-CM

## 2025-02-18 DIAGNOSIS — H02.834: ICD-10-CM

## 2025-02-18 DIAGNOSIS — H02.831: ICD-10-CM

## 2025-02-18 DIAGNOSIS — H40.1131: ICD-10-CM

## 2025-02-18 DIAGNOSIS — H16.223: ICD-10-CM

## 2025-02-18 DIAGNOSIS — H26.493: ICD-10-CM

## 2025-02-18 PROCEDURE — 92133 CPTRZD OPH DX IMG PST SGM ON: CPT

## 2025-02-18 PROCEDURE — 92014 COMPRE OPH EXAM EST PT 1/>: CPT

## 2025-03-05 ENCOUNTER — OFFICE VISIT (OUTPATIENT)
Dept: URBAN - METROPOLITAN AREA CLINIC 68 | Facility: CLINIC | Age: 70
End: 2025-03-05
Payer: MEDICARE

## 2025-03-05 VITALS
WEIGHT: 136 LBS | SYSTOLIC BLOOD PRESSURE: 124 MMHG | HEIGHT: 64 IN | BODY MASS INDEX: 23.22 KG/M2 | DIASTOLIC BLOOD PRESSURE: 82 MMHG

## 2025-03-05 DIAGNOSIS — K64.9 HEMORRHOIDS, UNSPECIFIED HEMORRHOID TYPE: ICD-10-CM

## 2025-03-05 DIAGNOSIS — K64.1 GRADE II HEMORRHOIDS: ICD-10-CM

## 2025-03-05 PROCEDURE — 46221 LIGATION OF HEMORRHOID(S): CPT | Performed by: INTERNAL MEDICINE

## 2025-03-05 PROCEDURE — 99213 OFFICE O/P EST LOW 20 MIN: CPT | Performed by: INTERNAL MEDICINE

## 2025-03-05 RX ORDER — LATANOPROST 50 UG/ML
1 DROP INTO AFFECTED EYE IN THE EVENING SOLUTION/ DROPS OPHTHALMIC ONCE A DAY
Status: ACTIVE | COMMUNITY

## 2025-03-05 RX ORDER — CLOPIDOGREL BISULFATE 75 MG/1
1 TABLET TABLET ORAL ONCE A DAY
Status: ACTIVE | COMMUNITY

## 2025-03-05 RX ORDER — FAMOTIDINE 20 MG/1
1 TABLET AT BEDTIME AS NEEDED TABLET, FILM COATED ORAL ONCE A DAY
Status: ACTIVE | COMMUNITY

## 2025-03-05 RX ORDER — ATORVASTATIN CALCIUM 20 MG/1
1 TABLET TABLET, FILM COATED ORAL ONCE A DAY
Status: ACTIVE | COMMUNITY

## 2025-03-05 NOTE — HPI-TODAY'S VISIT:
69 y.o. WF with a history of questionable TIA (1/2023) who is on Clopidogrel (PCP Dr. Hall) for over a year who is here for first  hemorrhoid banding. She is  s/p colonoscopy 8/26/24 found with diverticulosis and IH, bx consistent with lymphocytic colitis. She was having persistent watery diarrhea for over one month however this has since resolved and she did not take Budesonide.  She does have some alternating bowel habits and has noticed occasional hemorrhoidal bleeding.  She does have a history in the past of chronic constipation which she was treating with Fiber and Miralax/d. She has a history for cholecystectomy and occasional does have loose stools.  A second hemorrhoid band was placed in R anterior column w/o complications. She would be recommended to hold her Clopidogrel for at least 3 days on day # 2 following her banding. She does feel that hemorrhoid banding has helped with her symptoms.

## 2025-03-26 ENCOUNTER — OFFICE VISIT (OUTPATIENT)
Dept: URBAN - METROPOLITAN AREA CLINIC 68 | Facility: CLINIC | Age: 70
End: 2025-03-26
Payer: MEDICARE

## 2025-03-26 ENCOUNTER — OFFICE VISIT (OUTPATIENT)
Dept: URBAN - METROPOLITAN AREA CLINIC 68 | Facility: CLINIC | Age: 70
End: 2025-03-26

## 2025-03-26 DIAGNOSIS — K64.1 GRADE II HEMORRHOIDS: ICD-10-CM

## 2025-03-26 PROCEDURE — 99213 OFFICE O/P EST LOW 20 MIN: CPT | Performed by: INTERNAL MEDICINE

## 2025-03-26 RX ORDER — FAMOTIDINE 20 MG/1
1 TABLET AT BEDTIME AS NEEDED TABLET, FILM COATED ORAL ONCE A DAY
COMMUNITY

## 2025-03-26 RX ORDER — FAMOTIDINE 20 MG/1
1 TABLET AT BEDTIME AS NEEDED TABLET, FILM COATED ORAL ONCE A DAY
Status: ACTIVE | COMMUNITY

## 2025-03-26 RX ORDER — ATORVASTATIN CALCIUM 20 MG/1
1 TABLET TABLET, FILM COATED ORAL ONCE A DAY
COMMUNITY

## 2025-03-26 RX ORDER — CLOPIDOGREL BISULFATE 75 MG/1
1 TABLET TABLET ORAL ONCE A DAY
COMMUNITY

## 2025-03-26 RX ORDER — CLOPIDOGREL BISULFATE 75 MG/1
1 TABLET TABLET ORAL ONCE A DAY
Status: ACTIVE | COMMUNITY

## 2025-03-26 RX ORDER — LATANOPROST 50 UG/ML
1 DROP INTO AFFECTED EYE IN THE EVENING SOLUTION/ DROPS OPHTHALMIC ONCE A DAY
Status: ACTIVE | COMMUNITY

## 2025-03-26 RX ORDER — LATANOPROST 50 UG/ML
1 DROP INTO AFFECTED EYE IN THE EVENING SOLUTION/ DROPS OPHTHALMIC ONCE A DAY
COMMUNITY

## 2025-03-26 RX ORDER — ATORVASTATIN CALCIUM 20 MG/1
1 TABLET TABLET, FILM COATED ORAL ONCE A DAY
Status: ACTIVE | COMMUNITY

## 2025-03-26 NOTE — HPI-TODAY'S VISIT:
69 y.o. WF with hemorrhoids who is here for follow up of her second hemorrhoid banding. She did have some scant sporadic rectal bleeding x 2 which subsided. She is on Clopidogrel for TIA and  she is  s/p colonoscopy 8/26/24 found with diverticulosis and IH, bx consistent with lymphocytic colitis. She was having persistent watery diarrhea for over one month however this has since resolved and she did not take Budesonide.  She has occasional constipation and that is when she may have flare up of her hemorrhoids. A rectal exam showed no  blood, no fissures, no significant palpable IH. She would like referral to colo rectal surgeon since she does get painful hemorrhoidal prolapse at times.  She does have a history in the past of chronic constipation which she was treating with Fiber and Miralax/d.

## 2025-07-07 ENCOUNTER — APPOINTMENT (OUTPATIENT)
Dept: URBAN - METROPOLITAN AREA CLINIC 124 | Facility: CLINIC | Age: 70
Setting detail: DERMATOLOGY
End: 2025-07-07

## 2025-07-07 DIAGNOSIS — L82.1 OTHER SEBORRHEIC KERATOSIS: ICD-10-CM

## 2025-07-07 DIAGNOSIS — Z87.2 PERSONAL HISTORY OF DISEASES OF THE SKIN AND SUBCUTANEOUS TISSUE: ICD-10-CM

## 2025-07-07 DIAGNOSIS — L57.8 OTHER SKIN CHANGES DUE TO CHRONIC EXPOSURE TO NONIONIZING RADIATION: ICD-10-CM

## 2025-07-07 PROCEDURE — ?

## 2025-07-07 PROCEDURE — ? COUNSELING

## 2025-07-07 ASSESSMENT — LOCATION SIMPLE DESCRIPTION DERM
LOCATION SIMPLE: RIGHT UPPER BACK
LOCATION SIMPLE: ABDOMEN
LOCATION SIMPLE: CHEST
LOCATION SIMPLE: RIGHT SHOULDER
LOCATION SIMPLE: LEFT SHOULDER

## 2025-07-07 ASSESSMENT — LOCATION DETAILED DESCRIPTION DERM
LOCATION DETAILED: UPPER STERNUM
LOCATION DETAILED: RIGHT SUPERIOR MEDIAL UPPER BACK
LOCATION DETAILED: LEFT POSTERIOR SHOULDER
LOCATION DETAILED: RIGHT POSTERIOR SHOULDER
LOCATION DETAILED: RIGHT RIB CAGE

## 2025-07-07 ASSESSMENT — LOCATION ZONE DERM
LOCATION ZONE: ARM
LOCATION ZONE: TRUNK

## 2025-07-07 NOTE — PROCEDURE: MIPS QUALITY
Detail Level: Detailed
Quality 508: Adult Covid-19 Vaccination Status: Patients who are up to date on their COVID-19 vaccinations as defined by CDC recommendations on current vaccination
Quality 47: Advance Care Plan: Advance Care Planning discussed and documented; advance care plan or surrogate decision maker documented in the medical record.
Quality 226: Preventive Care And Screening: Tobacco Use: Screening And Cessation Intervention: Patient screened for tobacco use and is an ex/non-smoker

## 2025-07-07 NOTE — HPI: FULL BODY SKIN EXAMINATION
What Is The Reason For Today's Visit?: Full Body Skin Examination
What Is The Reason For Today's Visit? (Being Monitored For X): concerning skin lesions on a periodic basis
Additional History: Pt states no concerns today.

## 2025-08-07 ENCOUNTER — FOLLOW UP (OUTPATIENT)
Age: 70
End: 2025-08-07

## 2025-08-07 DIAGNOSIS — H02.834: ICD-10-CM

## 2025-08-07 DIAGNOSIS — H40.1131: ICD-10-CM

## 2025-08-07 DIAGNOSIS — H02.831: ICD-10-CM

## 2025-08-07 PROCEDURE — 92083 EXTENDED VISUAL FIELD XM: CPT

## 2025-08-07 PROCEDURE — 99213 OFFICE O/P EST LOW 20 MIN: CPT
